# Patient Record
Sex: FEMALE | Race: WHITE | NOT HISPANIC OR LATINO | Employment: OTHER | URBAN - METROPOLITAN AREA
[De-identification: names, ages, dates, MRNs, and addresses within clinical notes are randomized per-mention and may not be internally consistent; named-entity substitution may affect disease eponyms.]

---

## 2021-12-10 ENCOUNTER — EMERGENCY VISIT (OUTPATIENT)
Dept: URBAN - METROPOLITAN AREA CLINIC 25 | Facility: CLINIC | Age: 76
End: 2021-12-10

## 2021-12-10 ENCOUNTER — NEW PATIENT (OUTPATIENT)
Dept: URBAN - METROPOLITAN AREA CLINIC 26 | Facility: CLINIC | Age: 76
End: 2021-12-10

## 2021-12-10 VITALS — HEIGHT: 63 IN | BODY MASS INDEX: 32.6 KG/M2 | WEIGHT: 184 LBS

## 2021-12-10 DIAGNOSIS — H43.392: ICD-10-CM

## 2021-12-10 DIAGNOSIS — H33.051: ICD-10-CM

## 2021-12-10 DIAGNOSIS — H43.11: ICD-10-CM

## 2021-12-10 DIAGNOSIS — H04.123: ICD-10-CM

## 2021-12-10 PROCEDURE — 92201 OPSCPY EXTND RTA DRAW UNI/BI: CPT

## 2021-12-10 PROCEDURE — 92012 INTRM OPH EXAM EST PATIENT: CPT

## 2021-12-10 PROCEDURE — 92004 COMPRE OPH EXAM NEW PT 1/>: CPT

## 2021-12-10 PROCEDURE — 76512 OPH US DX B-SCAN: CPT

## 2021-12-10 ASSESSMENT — VISUAL ACUITY
OD_SC: 20/100
OS_SC: 20/25+1
OS_SC: 20/20-1

## 2021-12-10 ASSESSMENT — TONOMETRY
OD_IOP_MMHG: 12
OS_IOP_MMHG: 8
OS_IOP_MMHG: 13
OD_IOP_MMHG: 7

## 2021-12-10 NOTE — PATIENT DISCUSSION
UNKNOWN CAUSE, HAD LASER IN [place], NOT CLEAR IF THERE WA A RETINAL TEAR. WILL NEED POSSIBLE FA WHEN HEME DECREASES.

## 2021-12-23 ENCOUNTER — FOLLOW UP (OUTPATIENT)
Dept: URBAN - METROPOLITAN AREA CLINIC 26 | Facility: CLINIC | Age: 76
End: 2021-12-23

## 2021-12-23 DIAGNOSIS — H43.11: ICD-10-CM

## 2021-12-23 DIAGNOSIS — H35.3121: ICD-10-CM

## 2021-12-23 DIAGNOSIS — H43.392: ICD-10-CM

## 2021-12-23 DIAGNOSIS — H04.123: ICD-10-CM

## 2021-12-23 PROCEDURE — 92014 COMPRE OPH EXAM EST PT 1/>: CPT

## 2021-12-23 PROCEDURE — 92235 FLUORESCEIN ANGRPH MLTIFRAME: CPT

## 2021-12-23 PROCEDURE — 92250 FUNDUS PHOTOGRAPHY W/I&R: CPT

## 2021-12-23 ASSESSMENT — TONOMETRY
OD_IOP_MMHG: 9
OS_IOP_MMHG: 10

## 2022-01-06 ENCOUNTER — FOLLOW UP (OUTPATIENT)
Dept: URBAN - METROPOLITAN AREA CLINIC 26 | Facility: CLINIC | Age: 77
End: 2022-01-06

## 2022-01-06 DIAGNOSIS — H04.123: ICD-10-CM

## 2022-01-06 DIAGNOSIS — H35.361: ICD-10-CM

## 2022-01-06 DIAGNOSIS — H43.11: ICD-10-CM

## 2022-01-06 DIAGNOSIS — H35.371: ICD-10-CM

## 2022-01-06 DIAGNOSIS — H35.3121: ICD-10-CM

## 2022-01-06 DIAGNOSIS — H43.392: ICD-10-CM

## 2022-01-06 PROCEDURE — 76512 OPH US DX B-SCAN: CPT

## 2022-01-06 PROCEDURE — 92201 OPSCPY EXTND RTA DRAW UNI/BI: CPT

## 2022-01-06 PROCEDURE — 92014 COMPRE OPH EXAM EST PT 1/>: CPT

## 2022-01-06 ASSESSMENT — TONOMETRY
OD_IOP_MMHG: 10
OS_IOP_MMHG: 10

## 2022-01-06 ASSESSMENT — VISUAL ACUITY
OD_SC: 20/60
OS_SC: 20/25+2

## 2022-02-08 ENCOUNTER — FOLLOW UP (OUTPATIENT)
Dept: URBAN - METROPOLITAN AREA CLINIC 26 | Facility: CLINIC | Age: 77
End: 2022-02-08

## 2022-02-08 DIAGNOSIS — H35.3121: ICD-10-CM

## 2022-02-08 DIAGNOSIS — H04.123: ICD-10-CM

## 2022-02-08 DIAGNOSIS — H35.371: ICD-10-CM

## 2022-02-08 DIAGNOSIS — H35.361: ICD-10-CM

## 2022-02-08 DIAGNOSIS — H43.392: ICD-10-CM

## 2022-02-08 DIAGNOSIS — H43.11: ICD-10-CM

## 2022-02-08 PROCEDURE — 92014 COMPRE OPH EXAM EST PT 1/>: CPT

## 2022-02-08 PROCEDURE — 92134 CPTRZ OPH DX IMG PST SGM RTA: CPT

## 2022-02-08 ASSESSMENT — VISUAL ACUITY
OS_SC: 20/25-1
OD_SC: 20/30-2

## 2022-02-08 ASSESSMENT — TONOMETRY
OD_IOP_MMHG: 11
OS_IOP_MMHG: 12

## 2022-03-04 ENCOUNTER — EMERGENCY VISIT (OUTPATIENT)
Dept: URBAN - METROPOLITAN AREA CLINIC 26 | Facility: CLINIC | Age: 77
End: 2022-03-04

## 2022-03-04 DIAGNOSIS — H43.11: ICD-10-CM

## 2022-03-04 DIAGNOSIS — H04.123: ICD-10-CM

## 2022-03-04 DIAGNOSIS — H35.371: ICD-10-CM

## 2022-03-04 DIAGNOSIS — H43.392: ICD-10-CM

## 2022-03-04 DIAGNOSIS — H35.3121: ICD-10-CM

## 2022-03-04 DIAGNOSIS — H35.361: ICD-10-CM

## 2022-03-04 PROCEDURE — 92201 OPSCPY EXTND RTA DRAW UNI/BI: CPT

## 2022-03-04 PROCEDURE — 92014 COMPRE OPH EXAM EST PT 1/>: CPT

## 2022-03-04 ASSESSMENT — TONOMETRY
OS_IOP_MMHG: 11
OD_IOP_MMHG: 09

## 2022-03-04 ASSESSMENT — VISUAL ACUITY
OD_SC: 20/30+2
OS_SC: 20/25-2

## 2022-04-14 ENCOUNTER — FOLLOW UP (OUTPATIENT)
Dept: URBAN - METROPOLITAN AREA CLINIC 26 | Facility: CLINIC | Age: 77
End: 2022-04-14

## 2022-04-14 VITALS — SYSTOLIC BLOOD PRESSURE: 129 MMHG | HEART RATE: 66 BPM | HEIGHT: 55 IN | DIASTOLIC BLOOD PRESSURE: 93 MMHG

## 2022-04-14 DIAGNOSIS — H43.11: ICD-10-CM

## 2022-04-14 DIAGNOSIS — H35.361: ICD-10-CM

## 2022-04-14 DIAGNOSIS — H02.834: ICD-10-CM

## 2022-04-14 DIAGNOSIS — H04.123: ICD-10-CM

## 2022-04-14 DIAGNOSIS — H43.392: ICD-10-CM

## 2022-04-14 DIAGNOSIS — H35.3121: ICD-10-CM

## 2022-04-14 DIAGNOSIS — H35.371: ICD-10-CM

## 2022-04-14 DIAGNOSIS — H43.811: ICD-10-CM

## 2022-04-14 DIAGNOSIS — H02.831: ICD-10-CM

## 2022-04-14 PROCEDURE — 92014 COMPRE OPH EXAM EST PT 1/>: CPT

## 2022-04-14 PROCEDURE — 92235 FLUORESCEIN ANGRPH MLTIFRAME: CPT

## 2022-04-14 PROCEDURE — 92250 FUNDUS PHOTOGRAPHY W/I&R: CPT

## 2022-04-14 ASSESSMENT — VISUAL ACUITY
OD_SC: 20/25
OS_SC: 20/30-2

## 2022-04-14 ASSESSMENT — TONOMETRY
OS_IOP_MMHG: 10
OD_IOP_MMHG: 09

## 2022-11-03 ENCOUNTER — FOLLOW UP (OUTPATIENT)
Dept: URBAN - METROPOLITAN AREA CLINIC 26 | Facility: CLINIC | Age: 77
End: 2022-11-03

## 2022-11-03 VITALS — BODY MASS INDEX: 31.89 KG/M2 | HEIGHT: 63 IN | WEIGHT: 180 LBS

## 2022-11-03 DIAGNOSIS — H04.123: ICD-10-CM

## 2022-11-03 DIAGNOSIS — H35.371: ICD-10-CM

## 2022-11-03 DIAGNOSIS — H43.392: ICD-10-CM

## 2022-11-03 DIAGNOSIS — H43.811: ICD-10-CM

## 2022-11-03 DIAGNOSIS — H35.361: ICD-10-CM

## 2022-11-03 DIAGNOSIS — H43.11: ICD-10-CM

## 2022-11-03 DIAGNOSIS — H35.3121: ICD-10-CM

## 2022-11-03 PROCEDURE — 92014 COMPRE OPH EXAM EST PT 1/>: CPT

## 2022-11-03 PROCEDURE — 92134 CPTRZ OPH DX IMG PST SGM RTA: CPT

## 2022-11-03 PROCEDURE — 92250 FUNDUS PHOTOGRAPHY W/I&R: CPT

## 2022-11-03 ASSESSMENT — VISUAL ACUITY
OS_SC: 20/20-2
OD_SC: 20/50-1

## 2022-11-03 ASSESSMENT — TONOMETRY
OS_IOP_MMHG: 9
OD_IOP_MMHG: 6

## 2023-04-07 ENCOUNTER — FOLLOW UP (OUTPATIENT)
Dept: URBAN - METROPOLITAN AREA CLINIC 26 | Facility: CLINIC | Age: 78
End: 2023-04-07

## 2023-04-07 DIAGNOSIS — H35.3121: ICD-10-CM

## 2023-04-07 DIAGNOSIS — H04.123: ICD-10-CM

## 2023-04-07 DIAGNOSIS — H02.831: ICD-10-CM

## 2023-04-07 DIAGNOSIS — H43.811: ICD-10-CM

## 2023-04-07 DIAGNOSIS — H35.371: ICD-10-CM

## 2023-04-07 DIAGNOSIS — H43.392: ICD-10-CM

## 2023-04-07 DIAGNOSIS — H35.361: ICD-10-CM

## 2023-04-07 DIAGNOSIS — H43.11: ICD-10-CM

## 2023-04-07 DIAGNOSIS — H02.834: ICD-10-CM

## 2023-04-07 PROCEDURE — 92014 COMPRE OPH EXAM EST PT 1/>: CPT

## 2023-04-07 PROCEDURE — 92134 CPTRZ OPH DX IMG PST SGM RTA: CPT

## 2023-04-07 PROCEDURE — 92250 FUNDUS PHOTOGRAPHY W/I&R: CPT

## 2023-04-07 ASSESSMENT — VISUAL ACUITY
OS_SC: 20/30+1
OD_SC: 20/50-1

## 2023-04-07 ASSESSMENT — TONOMETRY
OD_IOP_MMHG: 10
OS_IOP_MMHG: 11

## 2023-11-14 NOTE — PATIENT DISCUSSION
My findings and recommendations are based on patient's symptoms, eye exam, diagnostic testing, and records. DISCHARGE

## 2024-01-11 ENCOUNTER — COMPREHENSIVE EXAM (OUTPATIENT)
Dept: URBAN - METROPOLITAN AREA CLINIC 26 | Facility: CLINIC | Age: 79
End: 2024-01-11

## 2024-01-11 DIAGNOSIS — H04.123: ICD-10-CM

## 2024-01-11 DIAGNOSIS — H02.834: ICD-10-CM

## 2024-01-11 DIAGNOSIS — H35.371: ICD-10-CM

## 2024-01-11 DIAGNOSIS — H43.813: ICD-10-CM

## 2024-01-11 DIAGNOSIS — H35.3121: ICD-10-CM

## 2024-01-11 DIAGNOSIS — H43.11: ICD-10-CM

## 2024-01-11 DIAGNOSIS — H02.831: ICD-10-CM

## 2024-01-11 DIAGNOSIS — H35.361: ICD-10-CM

## 2024-01-11 PROCEDURE — 92250 FUNDUS PHOTOGRAPHY W/I&R: CPT

## 2024-01-11 PROCEDURE — 92134 CPTRZ OPH DX IMG PST SGM RTA: CPT

## 2024-01-11 PROCEDURE — 92014 COMPRE OPH EXAM EST PT 1/>: CPT

## 2024-01-11 ASSESSMENT — TONOMETRY
OD_IOP_MMHG: 15
OS_IOP_MMHG: 17

## 2024-01-11 ASSESSMENT — VISUAL ACUITY
OS_SC: 20/25
OD_SC: 20/25

## 2024-02-02 ENCOUNTER — NEW PATIENT (OUTPATIENT)
Dept: URBAN - METROPOLITAN AREA CLINIC 31 | Facility: CLINIC | Age: 79
End: 2024-02-02

## 2024-02-02 DIAGNOSIS — H04.123: ICD-10-CM

## 2024-02-02 DIAGNOSIS — H52.13: ICD-10-CM

## 2024-02-02 DIAGNOSIS — H35.361: ICD-10-CM

## 2024-02-02 DIAGNOSIS — H35.371: ICD-10-CM

## 2024-02-02 PROCEDURE — 92004 COMPRE OPH EXAM NEW PT 1/>: CPT

## 2024-02-02 PROCEDURE — 92015 DETERMINE REFRACTIVE STATE: CPT

## 2024-02-02 ASSESSMENT — VISUAL ACUITY
OD_SC: 20/40+1
OS_SC: 20/30-2

## 2024-02-02 ASSESSMENT — TONOMETRY
OD_IOP_MMHG: 10
OS_IOP_MMHG: 10